# Patient Record
Sex: FEMALE | Race: ASIAN | NOT HISPANIC OR LATINO | URBAN - METROPOLITAN AREA
[De-identification: names, ages, dates, MRNs, and addresses within clinical notes are randomized per-mention and may not be internally consistent; named-entity substitution may affect disease eponyms.]

---

## 2019-10-01 ENCOUNTER — OUTPATIENT (OUTPATIENT)
Dept: OUTPATIENT SERVICES | Facility: HOSPITAL | Age: 69
LOS: 1 days | End: 2019-10-01
Payer: MEDICAID

## 2019-10-01 PROCEDURE — G9001: CPT

## 2019-10-02 ENCOUNTER — EMERGENCY (EMERGENCY)
Facility: HOSPITAL | Age: 69
LOS: 1 days | Discharge: HOME | End: 2019-10-02
Attending: STUDENT IN AN ORGANIZED HEALTH CARE EDUCATION/TRAINING PROGRAM | Admitting: STUDENT IN AN ORGANIZED HEALTH CARE EDUCATION/TRAINING PROGRAM
Payer: COMMERCIAL

## 2019-10-02 VITALS
SYSTOLIC BLOOD PRESSURE: 143 MMHG | DIASTOLIC BLOOD PRESSURE: 71 MMHG | RESPIRATION RATE: 16 BRPM | HEART RATE: 69 BPM | TEMPERATURE: 97 F | OXYGEN SATURATION: 99 %

## 2019-10-02 PROCEDURE — 99284 EMERGENCY DEPT VISIT MOD MDM: CPT

## 2019-10-02 NOTE — ED PROVIDER NOTE - PROGRESS NOTE DETAILS
spoke with Dr Cortes, pt was supposed to make appt w dr chambers today and did not call dr chambers. pt will call dr chambers now to see if he can come in today. otherwise will speak with ortho here to see if can arrange timely follow up spoke with dr frost office, they can see pt as soon as they get in touch with . pt spoke with  who will try to reach out to . pt son ok for dc qand will follow up with dr chambers, also will be given ortho referral list
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
## 2019-10-02 NOTE — ED PROVIDER NOTE - NS ED ATTENDING STATEMENT MOD
Last seen in [facility] clinic on 10/5/18 by MARIXA Villanueva at which time BP in range.    High reading since then in urology    Refill request to MD/primary care provider.    Anastasiya Marcano RN     Attending Only
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
## 2019-10-02 NOTE — ED PROVIDER NOTE - NSFOLLOWUPINSTRUCTIONS_ED_ALL_ED_FT
PLease follow up with your orthopedist as soon as possible  Return to ED for new or concerning symptoms   Fracture    A fracture is a break in one of your bones. This can occur from a variety of injuries, especially traumatic ones. Symptoms include pain, bruising, or swelling. Do not use the injured limb. If a fracture is in one of the bones below your waist, do not put weight on that limb unless instructed to do so by your healthcare provider. Crutches or a cane may have been provided. A splint or cast may have been applied by your health care provider. Make sure to keep it dry and follow up with an orthopedist as instructed.    SEEK IMMEDIATE MEDICAL CARE IF YOU HAVE ANY OF THE FOLLOWING SYMPTOMS: numbness, tingling, increasing pain, or weakness in any part of the injured limb.

## 2019-10-02 NOTE — ED PROVIDER NOTE - PHYSICAL EXAMINATION
Gen: Well appearing in NAD  Head: NC/AT  Neck: trachea midline  Resp:  No distress  Ext: right leg in post splint. feet warm, good cap refill, sensation intact, can move all toes  Neuro:  A&O appears non focal  Skin:  Warm and dry as visualized  Psych:  Normal affect and mood

## 2019-10-02 NOTE — ED PROVIDER NOTE - PATIENT PORTAL LINK FT
You can access the FollowMyHealth Patient Portal offered by [facility] by registering at the following website: [email or web address]. By joining [employer]’s FollowMyHealth portal, you will also be able to view your health information using other applications (apps) compatible with our system.

## 2019-10-03 DIAGNOSIS — Z71.89 OTHER SPECIFIED COUNSELING: ICD-10-CM

## 2020-10-05 NOTE — ED PROVIDER NOTE - CARE PLAN
Ms. Winkler is a 67 year old female referred from Dr. Banks for evaluation of alteration in bowel habits.  She has been having problems with constipation for a few months now.  She was recently seen by Nephrology, Dr. Gill and was told to stop calcium and a few other supplements.  She is no longer having abdominal pain, but is still constipated.  She still has some mild abdominal discomfort on her sides which is relieved with defecation. She typically has a bowel movement every other day, but they are hard and sometimes has to strain.  She stopped her fiber supplement because she was getting better.  Her last colonoscopy was with Dr. Lucas was around 5-10 years ago.  SHe otherwise denies nausea, vomiting, change in bowel habits, blood in the stool or weight loss. No family history of colon cancer, inflammatory bowel disease GI cancers, peptic ulcer disease or chronic liver disease. The patient denies any significant alcohol use, tobacco use or illicit drug use. Principal Discharge DX:	Tibia/fibula fracture, right, closed, initial encounter